# Patient Record
Sex: MALE | Race: WHITE
[De-identification: names, ages, dates, MRNs, and addresses within clinical notes are randomized per-mention and may not be internally consistent; named-entity substitution may affect disease eponyms.]

---

## 2022-06-27 ENCOUNTER — HOSPITAL ENCOUNTER (EMERGENCY)
Dept: HOSPITAL 33 - ED | Age: 32
Discharge: HOME | End: 2022-06-27
Payer: COMMERCIAL

## 2022-06-27 VITALS — HEART RATE: 60 BPM | SYSTOLIC BLOOD PRESSURE: 142 MMHG | OXYGEN SATURATION: 98 % | DIASTOLIC BLOOD PRESSURE: 68 MMHG

## 2022-06-27 DIAGNOSIS — R11.10: ICD-10-CM

## 2022-06-27 DIAGNOSIS — R07.89: Primary | ICD-10-CM

## 2022-06-27 LAB
ALBUMIN SERPL-MCNC: 4.3 G/DL (ref 3.5–5)
ALP SERPL-CCNC: 51 U/L (ref 38–126)
ALT SERPL-CCNC: 22 U/L (ref 0–50)
AMYLASE SERPL-CCNC: 100 U/L (ref 30–110)
ANION GAP SERPL CALC-SCNC: 13.3 MEQ/L (ref 5–15)
AST SERPL QL: 24 U/L (ref 17–59)
BASOPHILS # BLD AUTO: 0.04 X10^3/UL (ref 0–0.4)
BILIRUB BLD-MCNC: 1.4 MG/DL (ref 0.2–1.3)
BUN SERPL-MCNC: 14 MG/DL (ref 9–20)
CALCIUM SPEC-MCNC: 9.2 MG/DL (ref 8.4–10.2)
CHLORIDE SERPL-SCNC: 103 MMOL/L (ref 98–107)
CO2 SERPL-SCNC: 27 MMOL/L (ref 22–30)
CREAT SERPL-MCNC: 1.04 MG/DL (ref 0.66–1.25)
EOSINOPHIL # BLD AUTO: 0.24 X10^3/UL (ref 0–0.5)
GFR SERPLBLD BASED ON 1.73 SQ M-ARVRAT: > 60 ML/MIN
GLUCOSE SERPL-MCNC: 93 MG/DL (ref 74–106)
HCT VFR BLD AUTO: 45.8 % (ref 42–50)
HGB BLD-MCNC: 15.6 G/DL (ref 12.5–18)
LIPASE SERPL-CCNC: 99 U/L (ref 23–300)
LYMPHOCYTES # SPEC AUTO: 2.35 X10^3/UL (ref 1–4.6)
MCH RBC QN AUTO: 31.6 PG (ref 26–32)
MCHC RBC AUTO-ENTMCNC: 34.1 G/DL (ref 32–36)
MONOCYTES # BLD AUTO: 0.59 X10^3/UL (ref 0–1.3)
PLATELET # BLD AUTO: 239 X10^3/UL (ref 150–450)
POTASSIUM SERPLBLD-SCNC: 4 MMOL/L (ref 3.5–5.1)
PROT SERPL-MCNC: 7.1 G/DL (ref 6.3–8.2)
RBC # BLD AUTO: 4.93 X10^6/UL (ref 4.1–5.6)
SODIUM SERPL-SCNC: 140 MMOL/L (ref 137–145)
WBC # BLD AUTO: 5.8 X10^3/UL (ref 4–10.5)

## 2022-06-27 PROCEDURE — 84484 ASSAY OF TROPONIN QUANT: CPT

## 2022-06-27 PROCEDURE — 36415 COLL VENOUS BLD VENIPUNCTURE: CPT

## 2022-06-27 PROCEDURE — 36000 PLACE NEEDLE IN VEIN: CPT

## 2022-06-27 PROCEDURE — 85025 COMPLETE CBC W/AUTO DIFF WBC: CPT

## 2022-06-27 PROCEDURE — 83690 ASSAY OF LIPASE: CPT

## 2022-06-27 PROCEDURE — 99284 EMERGENCY DEPT VISIT MOD MDM: CPT

## 2022-06-27 PROCEDURE — 96374 THER/PROPH/DIAG INJ IV PUSH: CPT

## 2022-06-27 PROCEDURE — 85379 FIBRIN DEGRADATION QUANT: CPT

## 2022-06-27 PROCEDURE — 71045 X-RAY EXAM CHEST 1 VIEW: CPT

## 2022-06-27 PROCEDURE — 80053 COMPREHEN METABOLIC PANEL: CPT

## 2022-06-27 PROCEDURE — 93005 ELECTROCARDIOGRAM TRACING: CPT

## 2022-06-27 PROCEDURE — 82150 ASSAY OF AMYLASE: CPT

## 2022-06-27 RX ADMIN — ONDANSETRON ONE MG: 2 INJECTION, SOLUTION INTRAMUSCULAR; INTRAVENOUS at 07:41

## 2022-06-27 RX ADMIN — MORPHINE SULFATE ONE: 2 INJECTION, SOLUTION INTRAMUSCULAR; INTRAVENOUS at 07:51

## 2022-06-27 RX ADMIN — ONDANSETRON ONE: 2 INJECTION, SOLUTION INTRAMUSCULAR; INTRAVENOUS at 07:51

## 2022-06-27 RX ADMIN — MORPHINE SULFATE ONE MG: 2 INJECTION, SOLUTION INTRAMUSCULAR; INTRAVENOUS at 07:41

## 2022-06-27 NOTE — XRAY
Indication: Chest pain.



Comparison: None



Portable chest hyperinflated and clear.  Heart and mediastinal structures

within normal limits.  Bony thorax intact.



Impression: Nonacute hyperinflated chest.

## 2022-06-27 NOTE — ERPHSYRPT
- History of Present Illness


Time Seen by Provider: 06/27/22 07:28


Historian: patient


Exam Limitations: no limitations


Patient Subjective Stated Complaint: Chest pain


Triage Nursing Assessment: Patient ambulated back to ED and transferred self to 

bed. Patient A+O x3. Patient's skin pink, warm and dry. Patient complains of 

chest pain that woke him up. Patient states he vomited X 1 then started having 

chest pain. Patient complains of constant pressure in chest 2/10. Lungs clear 

a/p enzo. Patient also states he was drinking alcohol last night.


Physician History: 





This is a 31-year-old white male who was fine yesterday with no complaints.  

However, this morning patient began having nonradiating, substernal, central pr

essure and fullness sensation in his chest.  This occurred almost immediately 

after vomiting.  Patient states that he did have steak and potatoes last evening

as well as consumed alcoholic beverages.  Patient has never had anything like 

this in the past.  He has no documented cardiac history.  He takes no 

medications chronically.  He denies fever, cough, nausea vomiting and diarrhea. 

He has no abdominal pain.


Timing/Duration: today


Activities at Onset: sleep


Quality: fullness, pressure


Location: substernal, central


Chest Pain Radiation: no radiation


Severity of Pain-Max: mild


Severity of Pain-Current: mild


Modifying Factors: Improves With: nothing


Associated Symptoms: vomiting, No abdominal pain, No shortness of breath (Once)


Prior Chest Pain/Cardiac Workup: no prior chest pain, no prior cardiac workup


Nitro Today/Relief: no nitro taken today


Aspirin Treatment Today: 81 mg x 4, provided by ED


Allergies/Adverse Reactions: 








cefuroxime [From Ceftin] Allergy (Verified 06/27/22 07:19)


   





Home Medications: 








No Reportable Medications [No Reported Medications]  06/27/22 [History]





Hx Influenza Vaccination/Date Given: No


Hx Pneumococcal Vaccination/Date Given: No


Immunizations Up to Date: Yes





Travel Risk





- International Travel


Have you traveled outside of the country in past 3 weeks: No





- Coronavirus Screening


Are you exhibiting any of the following symptoms?: No


Close contact with a COVID-19 positive Pt in past 14-21 Days: No





- Vaccine Status


Have you recieved a Covid-19 vaccination: Yes


: Pfizer





- Vaccination Dates


Date of 2cond Vaccination (if applicable): Oct 2021





- Review of Systems


Constitutional: No Symptoms


Eyes: No Symptoms


Ears, Nose, & Throat: No Symptoms


Respiratory: No Symptoms


Cardiac: Chest Pain


Abdominal/Gastrointestinal: No Symptoms


Genitourinary Symptoms: No Symptoms


Musculoskeletal: No Symptoms


Skin: No Symptoms


Neurological: No Symptoms


Psychological: No Symptoms


Endocrine: No Symptoms


Hematologic/Lymphatic: No Symptoms


Immunological/Allergic: No Symptoms


All Other Systems: Reviewed and Negative





- Past Medical History


Pertinent Past Medical History: No


Neurological History: No Pertinent History


ENT History: No Pertinent History


Cardiac History: Hypertension


Respiratory History: No Pertinent History


Endocrine Medical History: No Pertinent History


Musculoskeletal History: No Pertinent History


GI Medical History: No Pertinent History


 History: No Pertinent History


Psycho-Social History: No Pertinent History


Male Reproductive Disorders: No Pertinent History





- Past Surgical History


Past Surgical History: Yes


Other Surgical History: lymph node removal from right side of jaw





- Social History


Smoking Status: Never smoker


Exposure to second hand smoke: No


Drug Use: none


Patient Lives Alone: No





- Nursing Vital Signs


Nursing Vital Signs: 


                               Initial Vital Signs











Temperature  97.0 F   06/27/22 07:21


 


Pulse Rate  69   06/27/22 07:21


 


Respiratory Rate  16   06/27/22 07:21


 


Blood Pressure  141/90   06/27/22 07:21


 


O2 Sat by Pulse Oximetry  96   06/27/22 07:21








                                   Pain Scale











Pain Intensity                 2

















- Physical Exam


General Appearance: no apparent distress, alert, anxiety


Eye Exam: PERRL/EOMI, eyes nml inspection


Ears, Nose, Throat Exam: normal ENT inspection, moist mucous membranes


Neck Exam: normal inspection, non-tender, supple, full range of motion


Respiratory Exam: normal breath sounds, chest tenderness (Described as a 

substernal central pressure that is a 2 out of 10 this morning.  He denies pain 

at this time), lungs clear, airway intact, No respiratory distress


Cardiovascular Exam: regular rate/rhythm, normal heart sounds, normal peripheral

 pulses


Gastrointestinal/Abdomen Exam: soft, normal bowel sounds, No tenderness


Rectal Exam: not done


Back Exam: normal inspection, normal range of motion, No CVA tenderness, No 

vertebral tenderness


Extremity Exam: normal inspection, normal range of motion, pelvis stable


Neurologic Exam: alert, oriented x 3, cooperative, CNs II-XII nml as tested, 

normal mood/affect, nml cerebellar function, nml station & gait, sensation nml


Skin Exam: normal color, warm, dry


Lymphatic Exam: No adenopathy


**SpO2 Interpretation**: normal


SpO2: 96


O2 Delivery: Room Air





- Course


Nursing assessment & vital signs reviewed: Yes


EKG Interpreted by Me: RATE ([id number]), Sinus Rhythm, NORMAL AXIS, NORMAL INTERVALS, 

NORMAL QRS, NORMAL ST-T, Other (No ischemic changes on today's EKG.  No 

comparison twelve-lead EKG available.)


Ordered Tests: 


                               Active Orders 24 hr











 Category Date Time Status


 


 IV Insertion STAT Care  06/27/22 07:29 Active


 


 CHEST 1 VIEW (PORTABLE) Stat Exams  06/27/22 07:29 Taken


 


 AMYLASE Stat Lab  06/27/22 07:30 Completed


 


 CBC W DIFF Stat Lab  06/27/22 07:30 Completed


 


 CMP Stat Lab  06/27/22 07:30 Completed


 


 D-DIMER QUANTITATIVE Stat Lab  06/27/22 07:30 Completed


 


 LIPASE Stat Lab  06/27/22 07:30 Completed


 


 TROPONIN Q3H Lab  06/27/22 07:30 Completed


 


 TROPONIN Q3H Lab  06/27/22 10:30 Ordered


 


 TROPONIN Q3H Lab  06/27/22 13:30 Ordered


 


 TROPONIN Q3H Lab  06/27/22 16:30 Ordered


 


 TROPONIN Q3H Lab  06/27/22 19:30 Ordered








Medication Summary














Discontinued Medications














Generic Name Dose Route Start Last Admin





  Trade Name Freq  PRN Reason Stop Dose Admin


 


Aspirin  324 mg  06/27/22 07:30  06/27/22 07:40





  Aspirin 81 Mg Tab.Chew  PO  06/27/22 07:31  324 mg





  STAT ONE   Administration


 


Aspirin  Confirm  06/27/22 07:38 





  Aspirin 81 Mg Tab.Chew  Administered  06/27/22 07:39 





  Dose  





  324 mg  





  .ROUTE  





  .STK-MED ONE  


 


Morphine Sulfate  2 mg  06/27/22 07:29  06/27/22 07:51





  Morphine Sulfate 2 Mg/Ml Inj  IV  06/27/22 07:30  Not Given





  STAT ONE  


 


Morphine Sulfate  Confirm  06/27/22 07:39 





  Morphine Sulfate 2 Mg/Ml Inj  Administered  06/27/22 07:40 





  Dose  





  2 mg  





  .ROUTE  





  .STK-MED ONE  


 


Ondansetron HCl  4 mg  06/27/22 07:29  06/27/22 07:51





  Ondansetron Hcl 4 Mg/2 Ml Vial  IV  06/27/22 07:30  Not Given





  STAT ONE  


 


Ondansetron HCl  Confirm  06/27/22 07:38 





  Ondansetron Hcl 4 Mg/2 Ml Vial  Administered  06/27/22 07:39 





  Dose  





  4 mg  





  .ROUTE  





  .STK-MED ONE  


 


Pantoprazole Sodium  40 mg  06/27/22 07:29  06/27/22 07:40





  Pantoprazole 40 Mg Vial  IV  06/27/22 07:30  40 mg





  STAT ONE   Administration


 


Pantoprazole Sodium  Confirm  06/27/22 07:38 





  Pantoprazole 40 Mg Vial  Administered  06/27/22 07:39 





  Dose  





  40 mg  





  IV  





  .STK-MED ONE  











Lab/Rad Data: 


                           Laboratory Result Diagrams





                                 06/27/22 07:30 





                                 06/27/22 07:30 





                               Laboratory Results











  06/27/22 06/27/22 06/27/22 Range/Units





  07:30 07:30 07:30 


 


WBC     (4.0-10.5)  x10^3/uL


 


RBC     (4.1-5.6)  x10^6/uL


 


Hgb     (12.5-18.0)  g/dL


 


Hct     (42-50)  %


 


MCV     ()  fL


 


MCH     (26-32)  pg


 


MCHC     (32-36)  g/dL


 


RDW     (11.5-14.0)  %


 


Plt Count     (150-450)  x10^3/uL


 


MPV     (7.5-11.0)  fL


 


Gran %     (36.0-66.0)  %


 


Immature Gran % (Auto)     (0.00-0.4)  %


 


Nucleat RBC Rel Count     (0.00-0.1)  %


 


Eos # (Auto)     (0-0.5)  x10^3/uL


 


Immature Gran # (Auto)     (0.00-0.03)  x10^3u/L


 


Absolute Lymphs (auto)     (1.0-4.6)  x10^3/uL


 


Absolute Monos (auto)     (0.0-1.3)  x10^3/uL


 


Absolute Nucleated RBC     (0.00-0.01)  x10^3u/L


 


Lymphocytes %     (24.0-44.0)  %


 


Monocytes %     (0.0-12.0)  %


 


Eosinophils %     (0.00-5.0)  %


 


Basophils %     (0.0-0.4)  %


 


Absolute Granulocytes     (1.4-6.9)  x10^3/uL


 


Basophils #     (0-0.4)  x10^3/uL


 


D-Dimer  0.19    (0.0-0.50)  mg/L


 


Sodium    140  (137-145)  mmol/L


 


Potassium    4.0  (3.5-5.1)  mmol/L


 


Chloride    103  ()  mmol/L


 


Carbon Dioxide    27  (22-30)  mmol/L


 


Anion Gap    13.3  (5-15)  MEQ/L


 


BUN    14  (9-20)  mg/dL


 


Creatinine    1.04  (0.66-1.25)  mg/dL


 


Estimated GFR    > 60.0  ML/MIN


 


Glucose    93  ()  mg/dL


 


Calcium    9.2  (8.4-10.2)  mg/dL


 


Total Bilirubin    1.40 H  (0.2-1.3)  mg/dL


 


AST    24  (17-59)  U/L


 


ALT    22  (0-50)  U/L


 


Alkaline Phosphatase    51  ()  U/L


 


Troponin I   < 0.012   (0.000-0.034)  ng/mL


 


Serum Total Protein    7.1  (6.3-8.2)  g/dL


 


Albumin    4.3  (3.5-5.0)  g/dL


 


Amylase    100  ()  U/L


 


Lipase    99  ()  U/L














  06/27/22 Range/Units





  07:30 


 


WBC  5.8  (4.0-10.5)  x10^3/uL


 


RBC  4.93  (4.1-5.6)  x10^6/uL


 


Hgb  15.6  (12.5-18.0)  g/dL


 


Hct  45.8  (42-50)  %


 


MCV  92.9  ()  fL


 


MCH  31.6  (26-32)  pg


 


MCHC  34.1  (32-36)  g/dL


 


RDW  12.6  (11.5-14.0)  %


 


Plt Count  239  (150-450)  x10^3/uL


 


MPV  9.3  (7.5-11.0)  fL


 


Gran %  43.6  (36.0-66.0)  %


 


Immature Gran % (Auto)  0.3  (0.00-0.4)  %


 


Nucleat RBC Rel Count  0.0  (0.00-0.1)  %


 


Eos # (Auto)  0.24  (0-0.5)  x10^3/uL


 


Immature Gran # (Auto)  0.02  (0.00-0.03)  x10^3u/L


 


Absolute Lymphs (auto)  2.35  (1.0-4.6)  x10^3/uL


 


Absolute Monos (auto)  0.59  (0.0-1.3)  x10^3/uL


 


Absolute Nucleated RBC  0.00  (0.00-0.01)  x10^3u/L


 


Lymphocytes %  40.9  (24.0-44.0)  %


 


Monocytes %  10.3  (0.0-12.0)  %


 


Eosinophils %  4.2  (0.00-5.0)  %


 


Basophils %  0.7  (0.0-0.4)  %


 


Absolute Granulocytes  2.51  (1.4-6.9)  x10^3/uL


 


Basophils #  0.04  (0-0.4)  x10^3/uL


 


D-Dimer   (0.0-0.50)  mg/L


 


Sodium   (137-145)  mmol/L


 


Potassium   (3.5-5.1)  mmol/L


 


Chloride   ()  mmol/L


 


Carbon Dioxide   (22-30)  mmol/L


 


Anion Gap   (5-15)  MEQ/L


 


BUN   (9-20)  mg/dL


 


Creatinine   (0.66-1.25)  mg/dL


 


Estimated GFR   ML/MIN


 


Glucose   ()  mg/dL


 


Calcium   (8.4-10.2)  mg/dL


 


Total Bilirubin   (0.2-1.3)  mg/dL


 


AST   (17-59)  U/L


 


ALT   (0-50)  U/L


 


Alkaline Phosphatase   ()  U/L


 


Troponin I   (0.000-0.034)  ng/mL


 


Serum Total Protein   (6.3-8.2)  g/dL


 


Albumin   (3.5-5.0)  g/dL


 


Amylase   ()  U/L


 


Lipase   ()  U/L














- Progress


Progress: improved, re-examined


Air Movement: good


Progress Note: 





06/27/22 08:55


Chest x-ray shows no acute cardiopulmonary process.


Blood Culture(s) Obtained: No


Antibiotics given: No


Counseled pt/family regarding: lab results, diagnosis, need for follow-up, rad 

results





- Departure


Departure Disposition: Home


Clinical Impression: 


 Non-cardiac chest pain





Condition: Stable


Critical Care Time: No


Referrals: 


DOCTOR,NO FAMILY [Primary Care Provider] - Follow up/PCP as directed


Additional Instructions: 


Avoid fatty greasy spicy foods.  Avoid tobacco, caffeine and alcohol products.  

Follow-up with your primary care physician for further evaluation and 

management.

## 2023-10-10 NOTE — HP
DATE:  10/12/2023



HISTORY OF PRESENT ILLNESS:  Patient is a 33 year-old male who presents with complaints of 
heartburn. He had an EGD 7 years ago. Also complaining of dysphagia as well. It looks like 
he had a right neck nodule removed about 2 years ago. It was benign. It appears that the 
patient has been taking some Prilosec for his symptoms. He is having breakthrough reflux.



PAST MEDICAL HISTORY:  Gastroesophageal reflux disease, allergic rhinitis.



CURRENT MEDICATIONS:  Singulair, Prilosec.



ALLERGIES:  NEGATIVE.



PAST SURGERIES:  Lymph node biopsy.



SOCIAL HISTORY:  Former smoker, occasional alcohol.

FAMILY HISTORY:  Brain cancer.



REVIEW OF SYSTEMS:  

CONSTITUTIONAL:  Denies fever or chills.

CHEST:  Denies shortness of breath.

CVS:  Denies chest pain.

ABDOMEN:  Reports heartburn.



PHYSICAL EXAMINATION:  

GENERAL:  No acute distress.

CHEST:  Nonlabored. No shortness of breath.

CVS:  Regular rate and rhythm.

ABDOMEN:  Soft. 



IMPRESSION:  

1.  DYSPHAGIA AND REFLUX.



PLAN:  EGD with possible dilatation with Dr. Chava Field.



This report was dictated for Dr. Field by Lorin Pardo NP.

## 2023-10-12 ENCOUNTER — HOSPITAL ENCOUNTER (OUTPATIENT)
Dept: HOSPITAL 33 - SDC | Age: 33
Discharge: HOME | End: 2023-10-12
Attending: SURGERY
Payer: COMMERCIAL

## 2023-10-12 VITALS — HEART RATE: 66 BPM | DIASTOLIC BLOOD PRESSURE: 59 MMHG | SYSTOLIC BLOOD PRESSURE: 117 MMHG

## 2023-10-12 VITALS — RESPIRATION RATE: 16 BRPM | TEMPERATURE: 97.5 F | OXYGEN SATURATION: 100 %

## 2023-10-12 DIAGNOSIS — Z80.8: ICD-10-CM

## 2023-10-12 DIAGNOSIS — K22.2: ICD-10-CM

## 2023-10-12 DIAGNOSIS — K44.9: ICD-10-CM

## 2023-10-12 DIAGNOSIS — K21.9: Primary | ICD-10-CM

## 2023-10-12 DIAGNOSIS — R10.13: ICD-10-CM

## 2023-10-12 NOTE — OP
SURGERY DATE:    10/12/2023



SURGERY TIME:    1310



PREOPERATIVE DIAGNOSIS: 

1.  EPIGASTRIC PAIN.



POSTOPERATIVE DIAGNOSIS:

1.  GRADE II/IV GASTROESOPHAGEAL REFLUX DISEASE, MILD STRICTURE. THERE IS VERY LIGHT 
ELEVATION-EROSION ON THE LEFT LATERAL CORNER OF THE ESOPHAGOGASTRIC JUNCTION WHICH WAS 
BIOPSIED.



PROCEDURE:

1.  EGD.

2.  Cold biopsy X 1.

3.  Placement of a 48 Mejía dilator. 

    

SURGEON:        Wayne Field M.D.



ANESTHESIA:    MAC.



COMPLICATIONS:    None.



CONDITION:        Stable.



OPERATIVE PROCEDURE:  The dilator went totally easily. The scope was reintroduced. This 
area was kind of friable on the lateral wall at the esophagogastric junction. A 
representative biopsy was taken. I did not see any significant issue. The fundus, body, 
antrum, pylorus, duodenal bulb, second portion were normal. The scope was looped upon 
itself. At the esophagogastric junction, there was just a small hiatal hernia. There was 
just an irritated area at the left lateral wall. The scope was then withdrawn. The patient 
tolerated the procedure satisfactory.